# Patient Record
Sex: MALE | Race: WHITE | NOT HISPANIC OR LATINO | Employment: FULL TIME | URBAN - METROPOLITAN AREA
[De-identification: names, ages, dates, MRNs, and addresses within clinical notes are randomized per-mention and may not be internally consistent; named-entity substitution may affect disease eponyms.]

---

## 2017-11-11 ENCOUNTER — OFFICE VISIT (OUTPATIENT)
Dept: OPTOMETRY | Facility: CLINIC | Age: 32
End: 2017-11-11
Payer: COMMERCIAL

## 2017-11-11 DIAGNOSIS — H52.13 MYOPIA OF BOTH EYES: Primary | ICD-10-CM

## 2017-11-11 PROCEDURE — 99999 PR PBB SHADOW E&M-EST. PATIENT-LVL II: CPT | Mod: PBBFAC,,, | Performed by: OPTOMETRIST

## 2017-11-11 PROCEDURE — 92015 DETERMINE REFRACTIVE STATE: CPT | Mod: S$GLB,,, | Performed by: OPTOMETRIST

## 2017-11-11 PROCEDURE — 92004 COMPRE OPH EXAM NEW PT 1/>: CPT | Mod: S$GLB,,, | Performed by: OPTOMETRIST

## 2017-11-11 NOTE — PROGRESS NOTES
HPI      is here for annual eye exam.   Pt reports dryness OU  Computer 8+ hrs  (-)Flashes (-)Floaters  (-+)Itch, (+)tear, (+)burn, (+)Dryness. (+) OTC Drops Visine Extra   Strength  (-)Photophobia  (-)Glare (-)diplopia (-) headaches          Last edited by Salomon Rose, OD on 11/11/2017 10:23 AM. (History)        Vision Exam    Assessment /Plan     For exam results, see Encounter Report.    Myopia of both eyes  Eyeglass Final Rx     Eyeglass Final Rx       Sphere Cylinder Axis Dist [facility]    Right -0.25 Sphere  20/20    Left -0.75 +0.50 170 20/20    Type:  SVL    Expiration Date:  11/12/2018                  RTC 1 yr

## 2020-09-11 ENCOUNTER — OFFICE VISIT (OUTPATIENT)
Dept: URGENT CARE | Facility: CLINIC | Age: 35
End: 2020-09-11

## 2020-09-11 VITALS
DIASTOLIC BLOOD PRESSURE: 99 MMHG | RESPIRATION RATE: 18 BRPM | SYSTOLIC BLOOD PRESSURE: 142 MMHG | OXYGEN SATURATION: 99 % | HEIGHT: 72 IN | WEIGHT: 265 LBS | TEMPERATURE: 98 F | HEART RATE: 75 BPM | BODY MASS INDEX: 35.89 KG/M2

## 2020-09-11 DIAGNOSIS — M79.89 ARM SWELLING: ICD-10-CM

## 2020-09-11 DIAGNOSIS — L03.113 CELLULITIS OF RIGHT UPPER EXTREMITY: ICD-10-CM

## 2020-09-11 DIAGNOSIS — M25.521 RIGHT ELBOW PAIN: Primary | ICD-10-CM

## 2020-09-11 LAB
CTP QC/QA: YES
SARS-COV-2 RDRP RESP QL NAA+PROBE: NEGATIVE

## 2020-09-11 PROCEDURE — 96372 PR INJECTION,THERAP/PROPH/DIAG2ST, IM OR SUBCUT: ICD-10-PCS | Mod: S$GLB,,, | Performed by: FAMILY MEDICINE

## 2020-09-11 PROCEDURE — 99214 OFFICE O/P EST MOD 30 MIN: CPT | Mod: 25,S$GLB,, | Performed by: FAMILY MEDICINE

## 2020-09-11 PROCEDURE — 73080 XR ELBOW COMPLETE 3 VIEW RIGHT: ICD-10-PCS | Mod: FY,RT,S$GLB, | Performed by: RADIOLOGY

## 2020-09-11 PROCEDURE — 73080 X-RAY EXAM OF ELBOW: CPT | Mod: FY,RT,S$GLB, | Performed by: RADIOLOGY

## 2020-09-11 PROCEDURE — 96372 THER/PROPH/DIAG INJ SC/IM: CPT | Mod: S$GLB,,, | Performed by: FAMILY MEDICINE

## 2020-09-11 PROCEDURE — 99214 PR OFFICE/OUTPT VISIT, EST, LEVL IV, 30-39 MIN: ICD-10-PCS | Mod: 25,S$GLB,, | Performed by: FAMILY MEDICINE

## 2020-09-11 PROCEDURE — U0002 COVID-19 LAB TEST NON-CDC: HCPCS | Mod: QW,S$GLB,, | Performed by: FAMILY MEDICINE

## 2020-09-11 PROCEDURE — U0002: ICD-10-PCS | Mod: QW,S$GLB,, | Performed by: FAMILY MEDICINE

## 2020-09-11 RX ORDER — KETOROLAC TROMETHAMINE 30 MG/ML
30 INJECTION, SOLUTION INTRAMUSCULAR; INTRAVENOUS
Status: COMPLETED | OUTPATIENT
Start: 2020-09-11 | End: 2020-09-11

## 2020-09-11 RX ORDER — SULFAMETHOXAZOLE AND TRIMETHOPRIM 800; 160 MG/1; MG/1
1 TABLET ORAL 2 TIMES DAILY
Qty: 14 TABLET | Refills: 0 | Status: SHIPPED | OUTPATIENT
Start: 2020-09-11 | End: 2020-09-18

## 2020-09-11 RX ORDER — TESTOSTERONE CYPIONATE 200 MG/ML
INJECTION, SOLUTION INTRAMUSCULAR
COMMUNITY

## 2020-09-11 RX ADMIN — KETOROLAC TROMETHAMINE 30 MG: 30 INJECTION, SOLUTION INTRAMUSCULAR; INTRAVENOUS at 07:09

## 2020-09-11 NOTE — PROGRESS NOTES
Subjective:       Patient ID: Chilango Soto is a 35 y.o. male.    Vitals:  height is 6' (1.829 m) and weight is 120.2 kg (265 lb). His blood pressure is 145/98 (abnormal). His respiration is 17.     Chief Complaint: Elbow Injury    Patient presents with complaint of right elbow pain x3 days.  Patient stated that injury occurred while lifting weights. Patient states that the day after injury swelling started to occur    Elbow Injury  This is a new problem. The current episode started in the past 7 days. The problem occurs constantly. The problem has been gradually worsening. Associated symptoms include arthralgias, joint swelling and myalgias. Pertinent negatives include no chills, diaphoresis, fatigue, fever, nausea, numbness or vomiting. The symptoms are aggravated by standing and walking. He has tried NSAIDs for the symptoms.       Constitution: Negative for chills, sweating, fatigue and fever.   HENT: Negative for tongue pain and facial trauma.    Gastrointestinal: Negative for nausea, vomiting and diarrhea.   Musculoskeletal: Positive for pain, joint pain, joint swelling and muscle ache.   Neurological: Negative for coordination disturbances, numbness and tingling.       Objective:      Physical Exam   Constitutional: He is oriented to person, place, and time.   Cardiovascular: Regular rhythm. Tachycardia present.   Pulmonary/Chest: Effort normal and breath sounds normal.   Abdominal: Normal appearance.   Musculoskeletal:         General: Swelling and tenderness present.      Comments: Marked swelling and erythema of rt arm , the swelling begins at the mid to lower upper arm and extends down the entire rt arm. There is erythema and warmth. There is pain with pronation and supination and tenderness to the touch at the radial head and superior to the radial head there is acute pain in the distal upper arm.   No obvious bug bite bites.   The rt upper arm measures 16.5 inches around the left upper arm measures 15  inches around   Neurological: He is alert and oriented to person, place, and time.   Skin: Skin is warm and dry.         Comments: Erythema  Psychiatric: His behavior is normal. Mood, judgment and thought content normal.   Nursing note and vitals reviewed.        Assessment:       1. Right elbow pain    2. Arm swelling    3. Cellulitis of right upper extremity        Plan:         Right elbow pain  -     X-Ray Elbow Complete 3 views Right; Future; Expected date: 09/11/2020    Arm swelling  -     POCT COVID-19 Rapid Screening  -     US Upper Extremity Veins Right; Future; Expected date: 09/11/2020  I spoke with US dept Sat am and pt was scheduled for US at 3:30 pm today. I then called pt and informed him. He reports having lots of pain and requests pain med. Denies problems with narcotics in the past.      Cellulitis of right upper extremity  -     sulfamethoxazole-trimethoprim 800-160mg (BACTRIM DS) 800-160 mg Tab; Take 1 tablet by mouth 2 (two) times daily. for 7 days  Dispense: 14 tablet; Refill: 0          Patient Instructions       Discharge Instructions for Cellulitis  You have been diagnosed with cellulitis. This is an infection in the deepest layer of the skin. In some cases, the infection also affects the muscle. Cellulitis is caused by bacteria. The bacteria can enter the body through broken skin. This can happen with a cut, scratch, animal bite, or an insect bite that has been scratched. You may have been treated in the hospital with antibiotics and fluids. You will likely be given a prescription for antibiotics to take at home. This sheet will help you take care of yourself at home.  Home care  When you are home:  · Take the prescribed antibiotic medicine you are given as directed until it is gone. Take it even if you feel better. It treats the infection and stops it from returning. Not taking all the medicine can make future infections hard to treat.  · Keep the infected area clean.  · When possible,  raise the infected area above the level of your heart. This helps keep swelling down.  · Talk with your healthcare provider if you are in pain. Ask what kind of over-the-counter medicine you can take for pain.  · Apply clean bandages as advised.  · Take your temperature once a day for a week.  · Wash your hands often to prevent spreading the infection.  In the future, wash your hands before and after you touch cuts, scratches, or bandages. This will help prevent infection.   When to call your healthcare provider  Call your healthcare provider immediately if you have any of the following:  · Difficulty or pain when moving the joints above or below the infected area  · Discharge or pus draining from the area  · Fever of 100.4°F (38°C) or higher, or as directed by your healthcare provider  · Pain that gets worse in or around the infected   · Redness that gets worse in or around the infected area, particularly if the area of redness expands to a wider area  · Shaking chills  · Swelling of the infected area  · Vomiting   Date Last Reviewed: 8/1/2016  © 9301-1696 [employer]. [address] [place] [address]. All rights reserved. This information is not intended as a substitute for professional medical care. Always follow your healthcare professional's instructions.        Deep Vein Thrombosis (DVT)    Deep vein thrombosis (DVT) occurs when a blood clot (thrombus) forms in a deep vein. This happens most often in the leg. It can also happen in the arms or other parts of the body. A part of the clot called an embolus can break off and travel to the lungs. When this happens, its called a pulmonary embolism (PE). PE is a medical emergency. It can cut off blood flow and lead to death. Both DVT and PE are closely related. Together, they are often referred to by the term venous thromboembolism (VTE).  Risk factors for DVT  Anything that slows blood flow, injures the lining of a vein, or increases blood  clotting can make you more prone to having DVT. This includes the following:  · Long periods without movement (such as when sitting for many hours at a time or when recovering from major surgery or illness)  · Estrogen (female hormone) therapy, such as hormone replacement therapy (HRT) or oral contraceptives  · Fractured hip or leg  · Major surgery or joint replacement  · Major trauma or spinal cord injury  · Cancer  · Family history  · Excess weight or obesity  · Smoking  · Older age  Symptoms  DVT does not always cause symptoms. When symptoms do occur, they may appear around the site of the DVT, such as in the leg. Possible symptoms include:  · Swelling  · Pain  · Warmth  · Redness  · Tenderness  Home care  · You were likely prescribed blood thinners (anticoagulants). They may be given as pills (oral) or shots (injections). Follow all instructions when using these medicines. Note: Do not take blood thinners with other medicines, herbal remedies, or supplements without talking to your provider first. Certain medicines or products can affect how blood thinners work.  · Follow your providers instructions about activity and rest.  · If support or compression stockings are prescribed, wear them as directed. These may help improve blood flow in the legs.  · When sitting or lying down, move your ankles, toes and knees often. This may also help improve blood flow in the legs.  Follow-up care  Follow up with your healthcare provider, or as advised. If imaging tests were done, they may need further review by a doctor. You will be told of any new findings that may affect your care.  When to seek medical advice  Call your healthcare provider right away if any of these occur:  · New or increased swelling, pain, tenderness, warmth, or redness, in the leg, arm, or other area  · Blood in the urine  · Bleeding with bowel movements  Call 911  Call 911 right away if any of these occur:  · Bleeding from the nose, gums, a cut, or  vagina  · Heavy or uncontrolled bleeding  · Trouble breathing  · Chest pain or discomfort that worsens with deep breathing or coughing  · Coughing (may cough up blood)  · Fast heartbeat  · Sweating  · Anxiety  · Lightheadedness, dizziness, or fainting  Date Last Reviewed: 9/21/2015  © 3811-4884 [employer]. [address] [place] [address], [place] [address]. All rights reserved. This information is not intended as a substitute for professional medical care. Always follow your healthcare professional's instructions.          I contacted the [facility] to have contact pt to schedule the us tomorrow but they are only available M-F so I will call myself in am and see if he can be seen. If not then he may have to go in to the ed.

## 2020-09-12 ENCOUNTER — HOSPITAL ENCOUNTER (OUTPATIENT)
Dept: RADIOLOGY | Facility: OTHER | Age: 35
Discharge: HOME | End: 2020-09-12
Attending: FAMILY MEDICINE
Payer: COMMERCIAL

## 2020-09-12 ENCOUNTER — TELEPHONE (OUTPATIENT)
Dept: URGENT CARE | Facility: CLINIC | Age: 35
End: 2020-09-12

## 2020-09-12 DIAGNOSIS — M79.89 ARM SWELLING: ICD-10-CM

## 2020-09-12 PROCEDURE — 93971 EXTREMITY STUDY: CPT | Mod: 26,RT,, | Performed by: RADIOLOGY

## 2020-09-12 PROCEDURE — 93971 EXTREMITY STUDY: CPT | Mod: TC,RT

## 2020-09-12 PROCEDURE — 93971 US UPPER EXTREMITY VEINS RIGHT: ICD-10-PCS | Mod: 26,RT,, | Performed by: RADIOLOGY

## 2020-09-12 RX ORDER — HYDROCODONE BITARTRATE AND ACETAMINOPHEN 7.5; 325 MG/1; MG/1
1 TABLET ORAL EVERY 6 HOURS PRN
Qty: 28 TABLET | Refills: 0 | Status: SHIPPED | OUTPATIENT
Start: 2020-09-12 | End: 2020-09-19

## 2020-09-12 NOTE — TELEPHONE ENCOUNTER
I phoned pt with his test results. The US was negative for clots.  My thinking is now that most likely this is a case of cellulitis as I thought last night and hopefully after a few days on the antibiotics he should be feeling better.  He will continue the antibiotics and nsaids and use the hydrocodone prn for severe pain. He will call me or I will reach out to him by Monday. If not better he may need to be rechecked.

## 2020-09-12 NOTE — PATIENT INSTRUCTIONS
Discharge Instructions for Cellulitis  You have been diagnosed with cellulitis. This is an infection in the deepest layer of the skin. In some cases, the infection also affects the muscle. Cellulitis is caused by bacteria. The bacteria can enter the body through broken skin. This can happen with a cut, scratch, animal bite, or an insect bite that has been scratched. You may have been treated in the hospital with antibiotics and fluids. You will likely be given a prescription for antibiotics to take at home. This sheet will help you take care of yourself at home.  Home care  When you are home:  · Take the prescribed antibiotic medicine you are given as directed until it is gone. Take it even if you feel better. It treats the infection and stops it from returning. Not taking all the medicine can make future infections hard to treat.  · Keep the infected area clean.  · When possible, raise the infected area above the level of your heart. This helps keep swelling down.  · Talk with your healthcare provider if you are in pain. Ask what kind of over-the-counter medicine you can take for pain.  · Apply clean bandages as advised.  · Take your temperature once a day for a week.  · Wash your hands often to prevent spreading the infection.  In the future, wash your hands before and after you touch cuts, scratches, or bandages. This will help prevent infection.   When to call your healthcare provider  Call your healthcare provider immediately if you have any of the following:  · Difficulty or pain when moving the joints above or below the infected area  · Discharge or pus draining from the area  · Fever of 100.4°F (38°C) or higher, or as directed by your healthcare provider  · Pain that gets worse in or around the infected   · Redness that gets worse in or around the infected area, particularly if the area of redness expands to a wider area  · Shaking chills  · Swelling of the infected area  · Vomiting   Date Last Reviewed:  8/1/2016  © 4816-9527 [employer]. [address] [place] [address], [place] [address]. All rights reserved. This information is not intended as a substitute for professional medical care. Always follow your healthcare professional's instructions.        Deep Vein Thrombosis (DVT)    Deep vein thrombosis (DVT) occurs when a blood clot (thrombus) forms in a deep vein. This happens most often in the leg. It can also happen in the arms or other parts of the body. A part of the clot called an embolus can break off and travel to the lungs. When this happens, its called a pulmonary embolism (PE). PE is a medical emergency. It can cut off blood flow and lead to death. Both DVT and PE are closely related. Together, they are often referred to by the term venous thromboembolism (VTE).  Risk factors for DVT  Anything that slows blood flow, injures the lining of a vein, or increases blood clotting can make you more prone to having DVT. This includes the following:  · Long periods without movement (such as when sitting for many hours at a time or when recovering from major surgery or illness)  · Estrogen (female hormone) therapy, such as hormone replacement therapy (HRT) or oral contraceptives  · Fractured hip or leg  · Major surgery or joint replacement  · Major trauma or spinal cord injury  · Cancer  · Family history  · Excess weight or obesity  · Smoking  · Older age  Symptoms  DVT does not always cause symptoms. When symptoms do occur, they may appear around the site of the DVT, such as in the leg. Possible symptoms include:  · Swelling  · Pain  · Warmth  · Redness  · Tenderness  Home care  · You were likely prescribed blood thinners (anticoagulants). They may be given as pills (oral) or shots (injections). Follow all instructions when using these medicines. Note: Do not take blood thinners with other medicines, herbal remedies, or supplements without talking to your provider first. Certain medicines or products can  affect how blood thinners work.  · Follow your providers instructions about activity and rest.  · If support or compression stockings are prescribed, wear them as directed. These may help improve blood flow in the legs.  · When sitting or lying down, move your ankles, toes and knees often. This may also help improve blood flow in the legs.  Follow-up care  Follow up with your healthcare provider, or as advised. If imaging tests were done, they may need further review by a doctor. You will be told of any new findings that may affect your care.  When to seek medical advice  Call your healthcare provider right away if any of these occur:  · New or increased swelling, pain, tenderness, warmth, or redness, in the leg, arm, or other area  · Blood in the urine  · Bleeding with bowel movements  Call 911  Call 911 right away if any of these occur:  · Bleeding from the nose, gums, a cut, or vagina  · Heavy or uncontrolled bleeding  · Trouble breathing  · Chest pain or discomfort that worsens with deep breathing or coughing  · Coughing (may cough up blood)  · Fast heartbeat  · Sweating  · Anxiety  · Lightheadedness, dizziness, or fainting  Date Last Reviewed: 9/21/2015  © 8992-8554 [employer]. [address] [place] [address], [place] [address]. All rights reserved. This information is not intended as a substitute for professional medical care. Always follow your healthcare professional's instructions.      Call in am to schedule the US first thing tomorrow.   If positive you will need to go to ED